# Patient Record
Sex: MALE | Race: WHITE | URBAN - METROPOLITAN AREA
[De-identification: names, ages, dates, MRNs, and addresses within clinical notes are randomized per-mention and may not be internally consistent; named-entity substitution may affect disease eponyms.]

---
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
## 2021-05-16 VITALS
BODY MASS INDEX: 26.37 KG/M2 | SYSTOLIC BLOOD PRESSURE: 127 MMHG | HEART RATE: 75 BPM | WEIGHT: 168 LBS | HEIGHT: 67 IN | DIASTOLIC BLOOD PRESSURE: 68 MMHG

## 2022-02-16 ENCOUNTER — APPOINTMENT (OUTPATIENT)
Dept: GENERAL RADIOLOGY | Facility: HOSPITAL | Age: 22
End: 2022-02-16

## 2022-02-16 ENCOUNTER — HOSPITAL ENCOUNTER (EMERGENCY)
Facility: HOSPITAL | Age: 22
Discharge: HOME | End: 2022-02-16
Attending: EMERGENCY MEDICINE | Admitting: EMERGENCY MEDICINE

## 2022-02-16 VITALS
SYSTOLIC BLOOD PRESSURE: 132 MMHG | OXYGEN SATURATION: 98 % | TEMPERATURE: 99 F | HEIGHT: 67 IN | RESPIRATION RATE: 18 BRPM | BODY MASS INDEX: 29.03 KG/M2 | HEART RATE: 78 BPM | WEIGHT: 184.97 LBS | DIASTOLIC BLOOD PRESSURE: 78 MMHG
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
## 2022-02-16 PROCEDURE — 73140 X-RAY EXAM OF FINGER(S): CPT

## 2022-02-16 PROCEDURE — 99283 EMERGENCY DEPT VISIT LOW MDM: CPT

## 2022-02-16 RX ORDER — AMOXICILLIN AND CLAVULANATE POTASSIUM 875; 125 MG/1; MG/1
1 TABLET, FILM COATED ORAL 2 TIMES DAILY
Qty: 20 TABLET | Refills: 0 | Status: SHIPPED | OUTPATIENT
Start: 2022-02-16 | End: 2022-02-26

## 2022-02-16 NOTE — ED PROVIDER NOTES
Time: 2:10 PM EST  Arrived by: ZELDA  Chief Complaint: Dog bite  History provided by: Patient      History of Present Illness:  Patient is a 21 y.o. year old male that presents to the emergency department with complaint of puncture wounds that he sustained when he tried to break up a dog fight between his and another dog yesterday morning.  Patient complains of increasing pain in his left thumb and right ring finger.  He reports that his tetanus shot is up-to-date.  He denies fever or chills.  The patient is left-hand dominant.       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 No pertinent past medical history.  History reviewed. No pertinent surgical history.  History reviewed. No pertinent family history.    Home Medications:  Prior to Admission medications    Not on File        Social History:   PT  reports that he has never smoked. He has never used smokeless tobacco. He reports previous alcohol use. He reports that he does not use drugs.    Record Review:  I have reviewed the patient's records in [facility].     Review of Systems  Review of Systems   Constitutional: Negative for fever.   Skin: Positive for wound. Negative for rash.   All other systems reviewed and  "are negative.       Physical Exam    /78 (BP Location: Left arm, Patient Position: Sitting)   Pulse 78   Temp 99 °F (37.2 °C) (Oral)   Resp 18   Ht 170.2 cm (67\")   Wt 83.9 kg (184 lb 15.5 oz)   SpO2 98%   BMI 28.97 kg/m²     Physical Exam  Vitals and nursing note reviewed.   Constitutional:       General: He is not in acute distress.     Appearance: Normal appearance. He is not toxic-appearing.   HENT:      Head: Normocephalic and atraumatic.      Mouth/Throat:      Mouth: Mucous membranes are moist.   Eyes:      General: No scleral icterus.  Cardiovascular:      Rate and Rhythm: Normal rate and regular rhythm.      Pulses: Normal pulses.      Heart sounds: Normal heart sounds.   Pulmonary:      Effort: Pulmonary effort is normal. No respiratory distress.      Breath sounds: Normal breath sounds.   Abdominal:      General: Bowel sounds are normal.      Palpations: Abdomen is soft.      Tenderness: There is no abdominal tenderness.   Musculoskeletal:         General: Normal range of motion.      Right hand: There is no disruption of two-point discrimination. Normal pulse.      Left hand: There is no disruption of two-point discrimination. Normal pulse.      Cervical back: Normal range of motion and neck supple.      Comments: No neurovascular deficit noted to bilateral hands   Skin:     General: Skin is warm and dry.      Comments: Opposing puncture wounds on distal tip of left thumb    Laceration along nail on fourth ring finger with erythema, swelling, and scant amount of pus drainage    Small puncture wound to posterior right long finger with surrounding erythema     Neurological:      Mental Status: He is alert and oriented to person, place, and time. Mental status is at baseline.                  ED Course  /78 (BP Location: Left arm, Patient Position: Sitting)   Pulse 78   Temp 99 °F (37.2 °C) (Oral)   Resp 18   Ht 170.2 cm (67\")   Wt 83.9 kg (184 lb 15.5 oz)   SpO2 98%   BMI 28.97 " kg/m²   No results found for this or any previous visit.  Medications - No data to display  XR Finger 2+ View Left    Result Date: 2/16/2022  Narrative: PROCEDURE: XR FINGER 2+ VW LEFT  COMPARISON: None  INDICATIONS: dog bite left thumb  FINDINGS:  Three views left thumb demonstrates no acute fracture is seen.  No foreign body evident.  No malalignment is seen.      Impression:   1. No acute osseous finding.      JUSTIN MINER MD       Electronically Signed and Approved By: JUSTIN MINER MD on 2/16/2022 at 15:40             XR Finger 2+ View Right    Result Date: 2/16/2022  Narrative: PROCEDURE: XR FINGER 2+ VW RIGHT  COMPARISON: None  INDICATIONS: dog bite right 4th digit  FINDINGS:  BONES: Normal.  No significant arthropathy or acute abnormality.  SOFT TISSUES: Negative.  No visible soft tissue swelling.  EFFUSION: None visible.  OTHER: Negative.       Impression:   1. No acute osseous finding.      JUSTIN MINER MD       Electronically Signed and Approved By: JUSTIN MINER MD on 2/16/2022 at 15:42               Procedures/EKGs:  Procedures      Medical Decision Making:                     MDM  Number of Diagnoses or Management Options  Dog bite, initial encounter: new and requires workup  Diagnosis management comments: There were no fractures or foreign bodies identified on the x-ray.  I spoke with Monique at [facility] to arrange for follow-up for wound reevaluation for the patient.  She said they would call the patient to schedule follow-up appointment.  Patient was prescribed oral antibiotic and antibiotic ointment.  The patient was given detailed return precautions.     I have spoken with the patient and explained the patient´s condition, diagnoses and treatment plan based on the information available to me at this time. I have answered the patient's questions and addressed any concerns. The patient has a good  understanding of the diagnosis, condition, and treatment plan as can be  expected at this point. The vital signs have been stable. The patient´s condition is stable and appropriate for discharge from the emergency department.      The patient will pursue further outpatient evaluation with the primary care physician or other designated or consulting physician as outlined in the discharge instructions. They are agreeable to this plan of care and follow-up instructions have been explained in detail. The patient has received these instructions in written format and has expressed an understanding of the discharge instructions. The patient is aware that any significant change in condition or worsening of symptoms should prompt an immediate return to this or the closest emergency department or call to 911.       Amount and/or Complexity of Data Reviewed  Tests in the radiology section of CPT®: reviewed and ordered    Risk of Complications, Morbidity, and/or Mortality  Presenting problems: low  Diagnostic procedures: minimal  Management options: minimal         Final diagnoses:   Dog bite, initial encounter        Disposition:  ED Disposition     ED Disposition Condition Comment    Discharge Stable            nAitha Taylor APRN  02/16/22 1809       Anitha Taylor APRN  02/16/22 1844

## 2022-02-16 NOTE — DISCHARGE INSTRUCTIONS
Keep your wound clean and dry.  Wash your wound twice a day with soap and water.  You can also soak your incision Epson salt solution.  Take antibiotics as prescribed.    Be sure to follow-up with [facility] in [place] for re-evaluation, they will call you for an appointment.    Return for worsening symptoms such as fever, significant swelling, streaking up of your arm, or other concerns.